# Patient Record
Sex: MALE | Race: WHITE | NOT HISPANIC OR LATINO | Employment: OTHER | URBAN - METROPOLITAN AREA
[De-identification: names, ages, dates, MRNs, and addresses within clinical notes are randomized per-mention and may not be internally consistent; named-entity substitution may affect disease eponyms.]

---

## 2023-11-21 ENCOUNTER — TELEMEDICINE (OUTPATIENT)
Dept: SURGERY | Facility: CLINIC | Age: 58
End: 2023-11-21
Payer: MEDICARE

## 2023-11-21 DIAGNOSIS — R15.9 FULL INCONTINENCE OF FECES: Primary | ICD-10-CM

## 2023-11-21 PROCEDURE — 99213 OFFICE O/P EST LOW 20 MIN: CPT | Performed by: COLON & RECTAL SURGERY

## 2023-11-21 NOTE — PROGRESS NOTES
Colorectal Surgery New Patient Visit    History Of Present Illness  Jimi Landrum is a 58 y.o. male with history of stage IV rectal cancer s/p LAR in 2013 followed by pelvic exenteration and [place] pouch and end colostomy at [facility] in 2016 due to recurrence. He underwent Continent Colostomy creation in September 2019.     - Revision of colostomy 3/5/21   - Robotic assisted open incisional hernia repair with mesh on 11/19/21.   - Lysis of adhesions, robotic assisted converted to open with repair of enterotomy 9/27/22.     Patient presents today complaining of incontinence of his colonic pouch. He has been wearing a conventional appliance without problems.        Past Medical History  He has a past medical history of Personal history of other malignant neoplasm of rectum, rectosigmoid junction, and anus, Personal history of other mental and behavioral disorders, and Personal history of other venous thrombosis and embolism.    Surgical History  He has a past surgical history that includes Other surgical history (09/03/2019); Other surgical history (09/03/2019); Other surgical history (09/03/2019); Other surgical history (09/03/2019); Other surgical history (09/03/2019); and Other surgical history (09/03/2019).     Social History  He has no history on file for tobacco use, alcohol use, and drug use.    Family History  No family history on file.     Allergies  Patient has no allergy information on record.    Home Medications  Prior to Admission medications    Not on File         Review of Systems      Imaging:  No results found.       Labs:   Lab Results   Component Value Date    BILIDIR 0.1 12/12/2019    AST 37 12/12/2019    ALT 34 12/12/2019    ALKPHOS 201 (H) 12/12/2019    PROT 6.3 (L) 12/12/2019    ALBUMIN 2.6 (L) 12/17/2019          Physical Exam       Last Recorded Vitals  There were no vitals taken for this visit.      ASSESSMENT AND PLAN: s/p creation of continent colostomy from cecum/right colon. Now  incontinent of feces. Being managed by wearing appliance. I discussed situation with patient. I do not feel that an attempt at revision of his colonic pouch would have any reasonable chance of success. Likewise, I do not feel that he is a candidate for a conventional small bowel continent ileostomy due to his [place] pouch. I advised that he continue to manage the colostomy as he is by wearing an appliance. Patient is in agreement with recommendation.        11/21/2023